# Patient Record
Sex: MALE | Race: WHITE | HISPANIC OR LATINO
[De-identification: names, ages, dates, MRNs, and addresses within clinical notes are randomized per-mention and may not be internally consistent; named-entity substitution may affect disease eponyms.]

---

## 2023-05-03 ENCOUNTER — APPOINTMENT (OUTPATIENT)
Dept: ORTHOPEDIC SURGERY | Facility: CLINIC | Age: 48
End: 2023-05-03
Payer: COMMERCIAL

## 2023-05-03 VITALS — WEIGHT: 220 LBS | HEIGHT: 70.5 IN | BODY MASS INDEX: 31.14 KG/M2

## 2023-05-03 DIAGNOSIS — S16.1XXA STRAIN OF MUSCLE, FASCIA AND TENDON AT NECK LEVEL, INITIAL ENCOUNTER: ICD-10-CM

## 2023-05-03 DIAGNOSIS — Z78.9 OTHER SPECIFIED HEALTH STATUS: ICD-10-CM

## 2023-05-03 DIAGNOSIS — M54.12 RADICULOPATHY, CERVICAL REGION: ICD-10-CM

## 2023-05-03 DIAGNOSIS — Z00.00 ENCOUNTER FOR GENERAL ADULT MEDICAL EXAMINATION W/OUT ABNORMAL FINDINGS: ICD-10-CM

## 2023-05-03 DIAGNOSIS — S46.911A STRAIN OF UNSPECIFIED MUSCLE, FASCIA AND TENDON AT SHOULDER AND UPPER ARM LEVEL, RIGHT ARM, INITIAL ENCOUNTER: ICD-10-CM

## 2023-05-03 PROCEDURE — 73030 X-RAY EXAM OF SHOULDER: CPT | Mod: RT

## 2023-05-03 PROCEDURE — 99214 OFFICE O/P EST MOD 30 MIN: CPT

## 2023-05-03 PROCEDURE — 72040 X-RAY EXAM NECK SPINE 2-3 VW: CPT

## 2023-05-03 PROCEDURE — 73010 X-RAY EXAM OF SHOULDER BLADE: CPT | Mod: RT

## 2023-05-03 RX ORDER — METHYLPREDNISOLONE 4 MG/1
4 TABLET ORAL
Qty: 1 | Refills: 0 | Status: ACTIVE | COMMUNITY
Start: 2023-05-03 | End: 1900-01-01

## 2023-05-03 NOTE — CONSULT LETTER
[Dear  ___] : Dear  [unfilled], [Consult Letter:] : I had the pleasure of evaluating your patient, [unfilled]. [Please see my note below.] : Please see my note below. [Consult Closing:] : Thank you very much for allowing me to participate in the care of this patient.  If you have any questions, please do not hesitate to contact me. [Sincerely,] : Sincerely, [FreeTextEntry3] : William Rodriguez M.D.\[facility]

## 2023-05-03 NOTE — REASON FOR VISIT
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  There is pain with reaching and lifting.  Sleep can be affected.  He can have numbness down the arm sometimes.  This "pulses."  No consistent medications.  This is his first evaluation.

## 2023-05-03 NOTE — HISTORY OF PRESENT ILLNESS
[6] : 6 [5] : 5 [Dull/Aching] : dull/aching [Intermittent] : intermittent [Full time] : Work status: full time [de-identified] : Right shoulder pain for 2 months. No known injury.  [] : no [FreeTextEntry1] : Right shoulder [FreeTextEntry7] : numbness down to hand and pain to his neck [FreeTextEntry9] : icy hot

## 2023-05-03 NOTE — PHYSICAL EXAM
[Right] : right shoulder [Left] : left shoulder [Minimal] : minimal [5 ___] : forward flexion 5[unfilled]/5 [5___] : external rotation 5[unfilled]/5 [Sitting] : sitting [] : negative [place] [FreeTextEntry9] : IR to T12. [de-identified] : There is numbness down the arm. [TWNoteComboBox4] : passive forward flexion 150 degrees [de-identified] : external rotation 50 degrees

## 2023-05-03 NOTE — ASSESSMENT
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 \par Should his sx persist, advanced imaging can be considered.\par \par Patient seen by Dr. William Govea.\par Progress note completed by Madalyn TORO.\par Patient seen by Madalyn TORO under the supervision of Dr. William Govea.\par Entered by Madalyn TORO acting as a scribe for Dr. William Govea.\par Entered by Jordyn Cartwright acting as scribe.

## 2023-06-28 ENCOUNTER — APPOINTMENT (OUTPATIENT)
Dept: ORTHOPEDIC SURGERY | Facility: CLINIC | Age: 48
End: 2023-06-28